# Patient Record
Sex: MALE | URBAN - METROPOLITAN AREA
[De-identification: names, ages, dates, MRNs, and addresses within clinical notes are randomized per-mention and may not be internally consistent; named-entity substitution may affect disease eponyms.]

---

## 2024-11-18 ENCOUNTER — PREP FOR SURGERY (OUTPATIENT)
Dept: SURGERY | Facility: SURGERY CENTER | Age: 59
End: 2024-11-18
Payer: OTHER GOVERNMENT

## 2024-11-18 DIAGNOSIS — Z12.11 ENCOUNTER FOR SCREENING COLONOSCOPY: Primary | ICD-10-CM

## 2024-11-18 RX ORDER — SODIUM CHLORIDE 0.9 % (FLUSH) 0.9 %
10 SYRINGE (ML) INJECTION EVERY 12 HOURS SCHEDULED
OUTPATIENT
Start: 2024-11-18

## 2024-11-18 RX ORDER — SODIUM CHLORIDE 0.9 % (FLUSH) 0.9 %
10 SYRINGE (ML) INJECTION AS NEEDED
OUTPATIENT
Start: 2024-11-18

## 2024-11-18 RX ORDER — SODIUM CHLORIDE 9 MG/ML
40 INJECTION, SOLUTION INTRAVENOUS AS NEEDED
OUTPATIENT
Start: 2024-11-18

## 2024-12-23 RX ORDER — LISINOPRIL 10 MG/1
10 TABLET ORAL DAILY
COMMUNITY

## 2024-12-23 RX ORDER — DIPHENOXYLATE HYDROCHLORIDE AND ATROPINE SULFATE 2.5; .025 MG/1; MG/1
1 TABLET ORAL DAILY
COMMUNITY

## 2024-12-23 NOTE — SIGNIFICANT NOTE
Education provided the Patient on the following:    - Nothing to Eat or Drink after MN the night before the procedure    - Avoid red/purple fluids while completing their bowel prep as ordered by physician  -Contact Gastrointerologist office for any questions about specific details regarding colon prep    -You will need to have someone drive you home after your colonoscopy and remain with you for 24 hours after the procedure  - The date of your Surgery, you may have one visitor at bedside or within 10-15 minutes of [facility]  -Please wear warm socks when you arrive for your colonoscopy  -Remove all jewelry and leave any valuables before arriving the day of your procedure (all will have to be removed before leaving preop)  -You will need to arrive at 1230 on 12/27/24 for your colonoscopy    -Feel free to contact us at: [phone] with any additional questions/concerns

## 2024-12-27 ENCOUNTER — ANESTHESIA (OUTPATIENT)
Dept: SURGERY | Facility: SURGERY CENTER | Age: 59
End: 2024-12-27
Payer: OTHER GOVERNMENT

## 2024-12-27 ENCOUNTER — HOSPITAL ENCOUNTER (OUTPATIENT)
Facility: SURGERY CENTER | Age: 59
Setting detail: HOSPITAL OUTPATIENT SURGERY
Discharge: HOME | End: 2024-12-27
Attending: SURGERY | Admitting: SURGERY
Payer: OTHER GOVERNMENT

## 2024-12-27 ENCOUNTER — ANESTHESIA EVENT (OUTPATIENT)
Dept: SURGERY | Facility: SURGERY CENTER | Age: 59
End: 2024-12-27
Payer: OTHER GOVERNMENT

## 2024-12-27 VITALS
DIASTOLIC BLOOD PRESSURE: 97 MMHG | TEMPERATURE: 98.2 F | WEIGHT: 234 LBS | RESPIRATION RATE: 16 BRPM | HEART RATE: 61 BPM | BODY MASS INDEX: 31.01 KG/M2 | OXYGEN SATURATION: 98 % | SYSTOLIC BLOOD PRESSURE: 128 MMHG | HEIGHT: 73 IN

## 2024-12-27 DIAGNOSIS — Z12.11 ENCOUNTER FOR SCREENING COLONOSCOPY: ICD-10-CM

## 2024-12-27 PROCEDURE — 25010000002 PROPOFOL 1000 MG/100ML EMULSION: Performed by: ANESTHESIOLOGY

## 2024-12-27 PROCEDURE — 45380 COLONOSCOPY AND BIOPSY: CPT | Performed by: SURGERY

## 2024-12-27 PROCEDURE — 25010000002 PROPOFOL 10 MG/ML EMULSION: Performed by: ANESTHESIOLOGY

## 2024-12-27 PROCEDURE — 88305 TISSUE EXAM BY PATHOLOGIST: CPT | Performed by: SURGERY

## 2024-12-27 PROCEDURE — 25010000002 LIDOCAINE PF 1% 1 % SOLUTION: Performed by: ANESTHESIOLOGY

## 2024-12-27 RX ORDER — ONDANSETRON 2 MG/ML
4 INJECTION INTRAMUSCULAR; INTRAVENOUS ONCE AS NEEDED
Status: DISCONTINUED | OUTPATIENT
Start: 2024-12-27 | End: 2024-12-27 | Stop reason: HOSPADM

## 2024-12-27 RX ORDER — SODIUM CHLORIDE 9 MG/ML
40 INJECTION, SOLUTION INTRAVENOUS AS NEEDED
Status: DISCONTINUED | OUTPATIENT
Start: 2024-12-27 | End: 2024-12-27 | Stop reason: HOSPADM

## 2024-12-27 RX ORDER — SODIUM CHLORIDE 0.9 % (FLUSH) 0.9 %
10 SYRINGE (ML) INJECTION EVERY 12 HOURS SCHEDULED
Status: DISCONTINUED | OUTPATIENT
Start: 2024-12-27 | End: 2024-12-27 | Stop reason: HOSPADM

## 2024-12-27 RX ORDER — PROPOFOL 10 MG/ML
INJECTION, EMULSION INTRAVENOUS CONTINUOUS PRN
Status: DISCONTINUED | OUTPATIENT
Start: 2024-12-27 | End: 2024-12-27 | Stop reason: SURG

## 2024-12-27 RX ORDER — SODIUM CHLORIDE 0.9 % (FLUSH) 0.9 %
10 SYRINGE (ML) INJECTION AS NEEDED
Status: DISCONTINUED | OUTPATIENT
Start: 2024-12-27 | End: 2024-12-27 | Stop reason: HOSPADM

## 2024-12-27 RX ORDER — PROPOFOL 10 MG/ML
VIAL (ML) INTRAVENOUS AS NEEDED
Status: DISCONTINUED | OUTPATIENT
Start: 2024-12-27 | End: 2024-12-27 | Stop reason: SURG

## 2024-12-27 RX ORDER — LIDOCAINE HYDROCHLORIDE 10 MG/ML
INJECTION, SOLUTION EPIDURAL; INFILTRATION; INTRACAUDAL; PERINEURAL AS NEEDED
Status: DISCONTINUED | OUTPATIENT
Start: 2024-12-27 | End: 2024-12-27 | Stop reason: SURG

## 2024-12-27 RX ADMIN — PROPOFOL 180 MCG/KG/MIN: 10 INJECTION, EMULSION INTRAVENOUS at 12:28

## 2024-12-27 RX ADMIN — LIDOCAINE HYDROCHLORIDE 50 MG: 10 INJECTION, SOLUTION EPIDURAL; INFILTRATION; INTRACAUDAL; PERINEURAL at 12:28

## 2024-12-27 RX ADMIN — PROPOFOL 100 MG: 10 INJECTION, EMULSION INTRAVENOUS at 12:28

## 2024-12-27 NOTE — OP NOTE
Colonoscopy Procedure Note  Kalia Kay  1965  Date of Procedure: 12/27/24    Pre-operative Diagnosis:    Encounter for screening colonoscopy    Post-operative Diagnosis:  Sigmoid colon polyp, diverticulosis    Procedure: Colonoscopy to cecum with cold forcep biopsy of sigmoid colon    Findings/Treatments:   Tiny 1 to 2 mm sessile polyp of sigmoid colon  A few scattered diverticula throughout the colon       Recommendations:   Follow-up results of pathology.  Recommend high-fiber diet for diverticulosis.  The office will call within the next  3-10 days with a final recommendation.    Surgeon: Armaan Elizalde MD    Anesthetic: MAC per Viola Bejarano MD      Procedure Details:    MAC anesthesia was induced.  A digital rectal exam was performed along with visual inspection of the anus. The 180 Colonoscope was inserted into the rectum and advanced to the cecum, with relative ease.  Cecum was identified by the appendiceal orifice and the ileocecal valve and photographed for documentation.       A careful inspection was made as the scope was withdrawn, including a retroflexed view of the rectum.  There were a few scattered diverticula throughout the colon most prevalent in the sigmoid colon.  In the sigmoid colon a tiny 1 to 2 mm sessile polyp was encountered and removed in its entirety with cold forcep. Retroflexion in the rectum revealed no abnormalities. Prep quality was excellent.      Armaan Elizalde M.D.  General, Endoscopic, and Robotic Surgery  [facility]    [address] [place] [address], [address]  [place], [place], [address]  P: [phone]  F: [phone]

## 2024-12-27 NOTE — ANESTHESIA POSTPROCEDURE EVALUATION
"Patient: Kalia Kay    Procedure Summary       Date: 12/27/24 Room / Location: [facility]  / [facility]    Anesthesia Start: 1224 Anesthesia Stop: 1254    Procedure: Colonoscopy to Cecum with Polypectomy Diagnosis:       Encounter for screening colonoscopy      (Encounter for screening colonoscopy [Z12.11])    Surgeons: Armaan Elizalde MD Provider: Viola Bejarano MD    Anesthesia Type: MAC ASA Status: 2            Anesthesia Type: MAC    Vitals  Vitals Value Taken Time   /97 12/27/24 1311   Temp 36.8 °C (98.2 °F) 12/27/24 1256   Pulse 61 12/27/24 1311   Resp 16 12/27/24 1311   SpO2 98 % 12/27/24 1311           Post Anesthesia Care and Evaluation    Patient location during evaluation: PHASE II  Patient participation: complete - patient participated  Level of consciousness: awake  Pain management: adequate    Airway patency: patent  Anesthetic complications: No anesthetic complications    Cardiovascular status: acceptable  Respiratory status: acceptable  Hydration status: acceptable    Comments: /97 (BP Location: Left arm, Patient Position: Lying)   Pulse 61   Temp 36.8 °C (98.2 °F) (Temporal)   Resp 16   Ht 185.4 cm (73\")   Wt 106 kg (234 lb)   SpO2 98%   BMI 30.87 kg/m²     "

## 2024-12-27 NOTE — H&P
SURGERY  [place] RENEE Casasey  1965 12/27/24    HPI: 59 y.o. male here for screening colonoscopy.  He has no prior history of polyps.  Does have a family history of colon cancer in a maternal grandfather.  Denies any family history in a parent or sibling.  Last colonoscopy at [facility] around 10 years ago.    Past Medical History:   Diagnosis Date    Hypertension     PONV (postoperative nausea and vomiting)     Skin cancer     Sleep apnea        Past Surgical History:   Procedure Laterality Date    COLONOSCOPY      KNEE CARTILAGE SURGERY Left     SEPTOPLASTY, RESECTION INFERIOR TURBINATES Bilateral     SHOULDER LIGAMENT REPAIR Right        is allergic to penicillins.       Medication List        ASK your doctor about these medications      lisinopril 10 MG tablet  Commonly known as: PRINIVIL,ZESTRIL     multivitamin tablet tablet     omeprazole 20 MG capsule  Commonly known as: priLOSEC              Family History   Problem Relation Age of Onset    Colon cancer Maternal Grandfather        Social History     Socioeconomic History    Marital status:    Tobacco Use    Smoking status: Never   Substance and Sexual Activity    Alcohol use: Yes     Comment: weekly    Drug use: Never    Sexual activity: Defer       Vitals:    12/27/24 1148   BP: 147/95   Pulse: 67   Resp: 16   Temp: 98 °F (36.7 °C)   SpO2: 98%       Body mass index is 30.87 kg/m².    Physical Exam    General: No acute distress  Lungs: No labored breathing, Pulse oximetry on room air is 98%.  Heart: RRR  Abdo: Soft  Mental:  Awake, alert, and oriented      Assessment/Plan  Encounter for Screening Colonoscopy  Proceed with screening colonoscopy.  Risk, benefits discussed including risk of perforation, bleeding.    Armaan Elizalde MD  12:27 EST

## 2024-12-27 NOTE — ANESTHESIA PREPROCEDURE EVALUATION
Anesthesia Evaluation     Patient summary reviewed   history of anesthetic complications:  PONV               Airway   Mallampati: II  No difficulty expected  Dental - normal exam     Pulmonary - normal exam   (+) ,sleep apnea  (-) not a smoker  Cardiovascular - normal exam    (+) hypertension      Neuro/Psych- negative ROS  GI/Hepatic/Renal/Endo - negative ROS     Musculoskeletal     Abdominal    Substance History      OB/GYN          Other                          Anesthesia Plan    ASA 2     MAC       Anesthetic plan, risks, benefits, and alternatives have been provided, discussed and informed consent has been obtained with: patient.        CODE STATUS:

## 2024-12-30 LAB
CYTO UR: NORMAL
LAB AP CASE REPORT: NORMAL
PATH REPORT.FINAL DX SPEC: NORMAL
PATH REPORT.GROSS SPEC: NORMAL

## (undated) DEVICE — KT ORCA ORCAPOD DISP STRL

## (undated) DEVICE — GOWN ISOL W/THUMB UNIV BLU BX/15

## (undated) DEVICE — VIAL FORMLN CAP 10PCT 20ML

## (undated) DEVICE — ADAPT CLN SCPE ENDO PORPOISE BX/50 DISP

## (undated) DEVICE — FLEX ADVANTAGE 1500CC: Brand: FLEX ADVANTAGE

## (undated) DEVICE — CANN O2 ETCO2 FITS ALL CONN CO2 SMPL A/ 7IN DISP LF

## (undated) DEVICE — SINGLE-USE BIOPSY FORCEPS: Brand: RADIAL JAW 4

## (undated) DEVICE — Device